# Patient Record
Sex: MALE | Race: WHITE | URBAN - METROPOLITAN AREA
[De-identification: names, ages, dates, MRNs, and addresses within clinical notes are randomized per-mention and may not be internally consistent; named-entity substitution may affect disease eponyms.]

---

## 2021-12-22 ENCOUNTER — OFFICE VISIT (OUTPATIENT)
Dept: URBAN - METROPOLITAN AREA CLINIC 17 | Facility: CLINIC | Age: 76
End: 2021-12-22
Payer: MEDICARE

## 2021-12-22 DIAGNOSIS — H35.3221 EXDTVE AGE-REL MCLR DEGN, LEFT EYE, WITH ACTV CHRDL NEOVAS: Primary | ICD-10-CM

## 2021-12-22 DIAGNOSIS — D31.31 BENIGN NEOPLASM OF RIGHT CHOROID: ICD-10-CM

## 2021-12-22 DIAGNOSIS — H35.3111 NEXDTVE AGE-RELATED MCLR DEGN, RIGHT EYE, EARLY DRY STAGE: ICD-10-CM

## 2021-12-22 PROCEDURE — 92134 CPTRZ OPH DX IMG PST SGM RTA: CPT | Performed by: OPHTHALMOLOGY

## 2021-12-22 PROCEDURE — 99204 OFFICE O/P NEW MOD 45 MIN: CPT | Performed by: OPHTHALMOLOGY

## 2021-12-22 ASSESSMENT — INTRAOCULAR PRESSURE
OD: 20
OS: 16

## 2021-12-22 NOTE — IMPRESSION/PLAN
Impression: Exdtve age-rel mclr degn, left eye, with actv chrdl neovas: H35.3221 Left. Vision: vision not affected. Condition: stable. Plan: Discussed diagnosis in detail with patient. No treatment is required at this time based on exam and diagnostic tests. Recommend observation for now. Will reassess condition in 12 months. OCT shows no leakage, no SRF or IRF. Not likely to respond to treatment.

## 2021-12-22 NOTE — IMPRESSION/PLAN
Impression: Nexdtve age-related mclr degn, right eye, early dry stage: H35.3111. Right. Plan: Discussed diagnosis in detail with patient. Discussed risks of progression with present condition. Exam shows the macula is stable. No treatment is needed at this time. Recommend observation for now. Continue taking the eye vitamins - AREDS 2 formula. OCT shows no IRF or SRF OD. Return in 1 year with Dr. Juan Tomlinson for [place] OCT and Optos.

## 2021-12-22 NOTE — IMPRESSION/PLAN
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 Vision: vision not affected. Condition: stable. Plan: Discussed diagnosis in detail with patient. Discussed treatment options with patient. Reassured patient of current condition and treatment. Will continue to observe condition and or symptoms. Exam shows small nevus temporal to fovea.  Return in 1 year with Dr. Hema Abdullahi

## 2025-05-15 ENCOUNTER — OFFICE VISIT (OUTPATIENT)
Dept: URBAN - METROPOLITAN AREA CLINIC 30 | Facility: CLINIC | Age: 80
End: 2025-05-15
Payer: MEDICARE

## 2025-05-15 DIAGNOSIS — H25.812 COMBINED FORMS OF AGE-RELATED CATARACT, LEFT EYE: ICD-10-CM

## 2025-05-15 DIAGNOSIS — H35.3223 EXUDATIVE AGE-REL MCLR DEGN, LEFT EYE, WITH INACTIVE SCAR: Primary | ICD-10-CM

## 2025-05-15 DIAGNOSIS — H35.3112 NEXDTVE AGE-RELATED MCLR DEGN, RIGHT EYE, INTERMED DRY STAGE: ICD-10-CM

## 2025-05-15 PROCEDURE — 99204 OFFICE O/P NEW MOD 45 MIN: CPT | Performed by: OPHTHALMOLOGY

## 2025-05-15 PROCEDURE — 92134 CPTRZ OPH DX IMG PST SGM RTA: CPT | Performed by: OPHTHALMOLOGY

## 2025-05-15 ASSESSMENT — INTRAOCULAR PRESSURE
OD: 20
OS: 13